# Patient Record
(demographics unavailable — no encounter records)

---

## 2025-07-09 NOTE — HISTORY OF PRESENT ILLNESS
[FreeTextEntry1] : Here for annual physical, last done with me on July 3, 2024 [de-identified] : #Depression - stable Sees psychiatrist Dr. Audelia Amaro  On Wellbutrin 300mg & Lamictal 200mg x many years Therapy every week Sleep: 7-8h but does not have good sleep hygiene  #History of low free testosterone and ED Saw Dr. Leone in Aug 2024, note reviewed Was given sildenafil PRN use   #HCM - Requesting STI screening - Vaccines: HPV Vaccine - received but thinks incomplete course, likely 1/3 dose only / Tdap 2023

## 2025-07-09 NOTE — HEALTH RISK ASSESSMENT
[Yes] : Yes [2 - 4 times a month (2 pts)] : 2-4 times a month (2 points) [1 or 2 (0 pts)] : 1 or 2 (0 points) [Never (0 pts)] : Never (0 points) [No] : In the past 12 months have you used drugs other than those required for medical reasons? No [Little interest or pleasure doing things] : 1) Little interest or pleasure doing things [Feeling down, depressed, or hopeless] : 2) Feeling down, depressed, or hopeless [0] : 2) Feeling down, depressed, or hopeless: Not at all (0) [PHQ-2 Negative - No further assessment needed] : PHQ-2 Negative - No further assessment needed [Former] : Former [HIV Test offered] : HIV Test offered [de-identified] : Few drinks a month  [de-identified] : Walks a lot, bikes occasionally, has not been to the gym in a while, pilates [de-identified] : Mostly vegetarian (eggs, cheese, chickpeas), cooks at home, a little bit of sweet tooth [[id number]] : 0 [de-identified] : Over 4 years, quit in 2014 [Reports changes in vision] : Reports no changes in vision [Reports changes in dental health] : Reports no changes in dental health [HepatitisCDate] : 07/2024 [de-identified] : Reinforced annual checks  [de-identified] : Reinforced regular checks 2x a year

## 2025-07-09 NOTE — PHYSICAL EXAM
[No Acute Distress] : no acute distress [Normal Voice/Communication] : normal voice/communication [Well Nourished] : well nourished [Well Developed] : well developed [Well-Appearing] : well-appearing [Normal Sclera/Conjunctiva] : normal sclera/conjunctiva [EOMI] : extraocular movements intact [Normal Outer Ear/Nose] : the outer ears and nose were normal in appearance [No Lymphadenopathy] : no lymphadenopathy [Supple] : supple [Thyroid Normal, No Nodules] : the thyroid was normal and there were no nodules present [No Respiratory Distress] : no respiratory distress  [No Accessory Muscle Use] : no accessory muscle use [Clear to Auscultation] : lungs were clear to auscultation bilaterally [Normal Rate] : normal rate  [Normal S1, S2] : normal S1 and S2 [No Murmur] : no murmur heard [No Edema] : there was no peripheral edema [Soft] : abdomen soft [Non-distended] : non-distended [No Joint Swelling] : no joint swelling [No Rash] : no rash [Normal Gait] : normal gait [Speech Grossly Normal] : speech grossly normal [Memory Grossly Normal] : memory grossly normal [Normal Affect] : the affect was normal [Alert and Oriented x3] : oriented to person, place, and time [Normal Mood] : the mood was normal [Normal Insight/Judgement] : insight and judgment were intact

## 2025-07-09 NOTE — ASSESSMENT
[FreeTextEntry1] : #HCM - Labs as ordered, including STI screening as requested - Vaccines: HPV Vaccine - advised to consider completing full course / Tdap 2023  [Vaccines Reviewed] : Immunizations reviewed today. Please see immunization details in the vaccine log within the immunization flowsheet.

## 2025-07-09 NOTE — REVIEW OF SYSTEMS
[Chest Pain] : no chest pain [Palpitations] : no palpitations [Claudication] : no  leg claudication [Lower Ext Edema] : no lower extremity edema [Orthopena] : no orthopnea [Paroxysmal Nocturnal Dyspnea] : no paroxysmal nocturnal dyspnea [Shortness Of Breath] : no shortness of breath [Wheezing] : no wheezing [Cough] : no cough [Dyspnea on Exertion] : not dyspnea on exertion [Abdominal Pain] : no abdominal pain [Nausea] : no nausea [Constipation] : no constipation [Diarrhea] : no diarrhea [Vomiting] : no vomiting [Heartburn] : no heartburn [Melena] : no melena

## 2025-07-09 NOTE — PLAN
[FreeTextEntry1] : Will obtain EKG given family history of afib/irregular rhythm Will also screen for thyroid d/o  Labs drawn in office Of note, is fasting for lipid panel